# Patient Record
Sex: MALE | Race: WHITE | NOT HISPANIC OR LATINO | URBAN - METROPOLITAN AREA
[De-identification: names, ages, dates, MRNs, and addresses within clinical notes are randomized per-mention and may not be internally consistent; named-entity substitution may affect disease eponyms.]

---

## 2018-01-10 NOTE — PROGRESS NOTES
Assessment    1  Well adult exam (V70 0) (Z00 00)   2  Generalized anxiety disorder (300 02) (F41 1)   3  Family history of hyperlipidemia (V18 19) (Z83 49) : Father    Plan  Generalized anxiety disorder    · (Q) TSH, 3RD GENERATION; Status:Active; Requested for:02May2016;   Well adult exam    · (Q) CBC (INCLUDES DIFF/PLT) (REFL); Status:Active; Requested for:02May2016;    · (Q) COMPREHENSIVE METABOLIC PNL W/ADJUSTED CALCIUM; Status:Active; Requested for:02May2016;    · (Q) LIPID PANEL WITH NON-HDL CHOLESTEROL; Status:Active; Requested  for:02May2016;    · (Q) URINALYSIS MICROSCOPIC; Status:Active; Requested for:02May2016;     Discussion/Summary  Impression: health maintenance visit  Currently, he eats a healthy diet and has an inadequate exercise regimen  Testicular cancer screening: the risks and benefits of testicular cancer screening were discussed and monthly self testicular exam was advised  Colorectal cancer screening: the risks and benefits of colorectal cancer screening were discussed and colorectal cancer screening is not indicated  Screening lab work includes glucose, lipid profile and urinalysis  The risks and benefits of immunizations were discussed and recommended Tdap vaccination  Patient will check with insurance regarding coverage  Advice and education were given regarding aerobic exercise, calcium supplements, vitamin D supplements, sunscreen use, self skin examination and seat belt use  Patient discussion: discussed with the patient  1 Well adult PE - recommended to follow a well balanced diet, start regular exercise  Check labs  2  DU -stable  Continue Paxil 60 mg daily  Follow-up with psychiatrist Dr Rakesh Moon  Schedule followup visit in 1 year or call sooner with any acute problems  Chief Complaint  Patient here as new patient for PE      History of Present Illness  HM, Adult Male: The patient is being seen for a health maintenance evaluation   The last health maintenance visit was 4 year(s) ago  Social History: He is unmarried  Work status: not currently employed  The patient has never smoked cigarettes  He reports occasional alcohol use  He has never used illicit drugs  General Health: The patient's health since the last visit is described as good  He has regular dental visits  He denies vision problems  He denies hearing loss  Lifestyle:  He consumes a diverse and healthy diet  He does not have any weight concerns  He does not exercise regularly  Reproductive health:  the patient is sexually active  Screening: Testicular cancer screening includes monthly self testicular examinations  Colorectal cancer screening includes no previous screening  metabolic screening reviewed and updated  Cardiovascular risk factors: no hypertension, no diabetes, no obesity, no tobacco use and no illicit drug use  Safety elements used: seat belt and safe driving habits  HPI: New patient presents to establish medical care and physical exam      He has H/o DU, currently under the care of psychiatrist Dr Angella Sumner  Patient takes Paxil 60 mg daily  Denies feeling depressed  No suicidal ideations  Anxiety symptoms are controlled on current dose of Paxil  Family history is positive for CAD, Hyperlipidemia, DM  Review of Systems    Constitutional: no fever, no chills and not feeling tired  Weight has been stable  Eyes: no eye pain, no eyesight problems, no dryness of the eyes, eyes not red, no purulent discharge from the eyes and no itching of the eyes  ENT: no complaints of earache, no hearing loss, no nosebleeds, no nasal discharge, no sore throat, no hoarseness  Cardiovascular: no chest pain, no intermittent leg claudication, no palpitations and no extremity edema  Respiratory: no shortness of breath, no cough, no wheezing and no shortness of breath during exertion     Gastrointestinal: no abdominal pain, no nausea, no vomiting, no constipation, no diarrhea and no blood in stools  Genitourinary: no dysuria, no incontinence and no nocturia  Musculoskeletal: no arthralgias, no joint swelling, no myalgias and no joint stiffness  Integumentary: no rashes, no itching and no skin wound  Neurological: no headache, no numbness, no tingling, no dizziness and no fainting  Psychiatric: anxiety, but not suicidal, no sleep disturbances and no depression  Hematologic/Lymphatic: No complaints of swollen glands, no swollen glands in the neck, does not bleed easily, no easy bruising  Surgical History    · History of Tonsillectomy    Family History  Father    · Family history of CABG   · Family history of CAD (coronary artery disease)   · Family history of hyperlipidemia (V18 19) (Z83 49)  Paternal Grandfather    · Family history of diabetes mellitus (V18 0) (Z83 3)    Social History    · Never smoker   · No alcohol use   · No drug use    Current Meds   1  PARoxetine HCl - 30 MG Oral Tablet; take 2 tab  daily; Therapy: [id number] to Recorded    Allergies    1  No Known Drug Allergies    Vitals   Recorded: [id number] 01:01PM   Temperature 98 9 F   Heart Rate 74   Respiration 16   Systolic 828   Diastolic 78   Height 5 ft 8 5 in   Weight 156 lb 4 00 oz   BMI Calculated 23 41   BSA Calculated 1 85   Pain Scale 0     Physical Exam    Constitutional   General appearance: No acute distress, well appearing and well nourished  Head and Face   Head and face: Normal     Palpation of the face and sinuses: No sinus tenderness  Eyes   Conjunctiva and lids: No erythema, swelling or discharge  Pupils and irises: Equal, round, reactive to light  Ears, Nose, Mouth, and Throat   External inspection of ears and nose: Normal     Otoscopic examination: Tympanic membranes translucent with normal light reflex  Canals patent without erythema  Hearing: Normal     Nasal mucosa, septum, and turbinates: Normal without edema or erythema  Lips, teeth, and gums: Normal, good dentition  Oropharynx: Normal with no erythema, edema, exudate or lesions  Neck   Neck: Supple, symmetric, trachea midline, no masses  Pulmonary   Respiratory effort: No increased work of breathing or signs of respiratory distress  Auscultation of lungs: Clear to auscultation  Cardiovascular   Auscultation of heart: Normal rate and rhythm, normal S1 and S2, no murmurs  Carotid pulses: 2+ bilaterally  Abdominal aorta: Normal     Pedal pulses: 2+ bilaterally  Examination of extremities for edema and/or varicosities: Normal     Abdomen   Abdomen: Non-tender, no masses  Liver and spleen: No hepatomegaly or splenomegaly  Lymphatic   Palpation of lymph nodes in neck: No lymphadenopathy  Musculoskeletal   Gait and station: Normal     Inspection/palpation of digits and nails: Normal without clubbing or cyanosis  Inspection/palpation of joints, bones, and muscles: Normal     Skin   Skin and subcutaneous tissue: Normal without rashes or lesions  Neurologic   Cranial nerves: Cranial nerves 2-12 intact  Sensation: No sensory loss  Psychiatric   Judgment and insight: Normal     Recent and remote memory: Intact      Mood and affect: Normal        Results/Data  Urine Dip Automated- POC [id number] 01:36PM Chadwick Rodney     Test Name Result Flag Reference   Color Clear     Clarity Transparent     Leukocytes NEG     Nitrite NEG     Blood NEG     Bilirubin NEG     Urobilinogen 0 2     Protein NEG     Ph 6 5     Specific Gravity 1 010     Ketone NEG     Glucose +- 100mg/dL         Signatures   Electronically signed by : THUY Olivas ; May  2 2016  1:40PM EST                       (Author)

## 2020-11-17 RX ORDER — SERTRALINE HYDROCHLORIDE 100 MG/1
TABLET, FILM COATED ORAL
Qty: 135 TABLET | Refills: 1 | OUTPATIENT
Start: 2020-11-17

## 2021-04-13 DIAGNOSIS — Z23 ENCOUNTER FOR IMMUNIZATION: ICD-10-CM

## 2021-04-29 ENCOUNTER — IMMUNIZATIONS (OUTPATIENT)
Dept: FAMILY MEDICINE CLINIC | Facility: HOSPITAL | Age: 36
End: 2021-04-29

## 2021-04-29 DIAGNOSIS — Z23 ENCOUNTER FOR IMMUNIZATION: Primary | ICD-10-CM

## 2021-04-29 PROCEDURE — 0001A SARS-COV-2 / COVID-19 MRNA VACCINE (PFIZER-BIONTECH) 30 MCG: CPT

## 2021-04-29 PROCEDURE — 91300 SARS-COV-2 / COVID-19 MRNA VACCINE (PFIZER-BIONTECH) 30 MCG: CPT

## 2021-05-19 ENCOUNTER — IMMUNIZATIONS (OUTPATIENT)
Dept: FAMILY MEDICINE CLINIC | Facility: HOSPITAL | Age: 36
End: 2021-05-19

## 2021-05-19 DIAGNOSIS — Z23 ENCOUNTER FOR IMMUNIZATION: Primary | ICD-10-CM

## 2021-05-19 PROCEDURE — 91300 SARS-COV-2 / COVID-19 MRNA VACCINE (PFIZER-BIONTECH) 30 MCG: CPT

## 2021-05-19 PROCEDURE — 0002A SARS-COV-2 / COVID-19 MRNA VACCINE (PFIZER-BIONTECH) 30 MCG: CPT

## 2025-06-10 ENCOUNTER — OFFICE VISIT (OUTPATIENT)
Dept: FAMILY MEDICINE CLINIC | Facility: CLINIC | Age: 40
End: 2025-06-10
Payer: COMMERCIAL

## 2025-06-10 VITALS
DIASTOLIC BLOOD PRESSURE: 64 MMHG | RESPIRATION RATE: 16 BRPM | SYSTOLIC BLOOD PRESSURE: 110 MMHG | BODY MASS INDEX: 23.85 KG/M2 | TEMPERATURE: 97.9 F | WEIGHT: 161 LBS | HEIGHT: 69 IN | HEART RATE: 90 BPM | OXYGEN SATURATION: 98 %

## 2025-06-10 DIAGNOSIS — Z11.4 SCREENING FOR HIV (HUMAN IMMUNODEFICIENCY VIRUS): ICD-10-CM

## 2025-06-10 DIAGNOSIS — Z13.220 SCREENING FOR HYPERLIPIDEMIA: ICD-10-CM

## 2025-06-10 DIAGNOSIS — Z00.00 ANNUAL PHYSICAL EXAM: Primary | ICD-10-CM

## 2025-06-10 DIAGNOSIS — F41.9 ANXIETY: ICD-10-CM

## 2025-06-10 DIAGNOSIS — Z11.59 NEED FOR HEPATITIS C SCREENING TEST: ICD-10-CM

## 2025-06-10 PROCEDURE — 99396 PREV VISIT EST AGE 40-64: CPT | Performed by: FAMILY MEDICINE

## 2025-06-10 RX ORDER — SERTRALINE HYDROCHLORIDE 100 MG/1
1.5 TABLET, FILM COATED ORAL DAILY
COMMUNITY
Start: 2025-04-18

## 2025-06-10 NOTE — PATIENT INSTRUCTIONS
"Patient Education     Routine physical for adults   The Basics   Written by the doctors and editors at [facility]   What is a physical? -- A physical is a routine visit, or \"check-up,\" with your doctor. You might also hear it called a \"wellness visit\" or \"preventive visit.\"  During each visit, the doctor will:   Ask about your physical and mental health   Ask about your habits, behaviors, and lifestyle   Do an exam   Give you vaccines if needed   Talk to you about any medicines you take   Give advice about your health   Answer your questions  Getting regular check-ups is an important part of taking care of your health. It can help your doctor find and treat any problems you have. But it's also important for preventing health problems.  A routine physical is different from a \"sick visit.\" A sick visit is when you see a doctor because of a health concern or problem. Since physicals are scheduled ahead of time, you can think about what you want to ask the doctor.  How often should I get a physical? -- It depends on your age and health. In general, for people age 21 years and older:   If you are younger than 50 years, you might be able to get a physical every 3 years.   If you are 50 years or older, your doctor might recommend a physical every year.  If you have an ongoing health condition, like diabetes or high blood pressure, your doctor will probably want to see you more often.  What happens during a physical? -- In general, each visit will include:   Physical exam - The doctor or nurse will check your height, weight, heart rate, and blood pressure. They will also look at your eyes and ears. They will ask about how you are feeling and whether you have any symptoms that bother you.   Medicines - It's a good idea to bring a list of all the medicines you take to each doctor visit. Your doctor will talk to you about your medicines and answer any questions. Tell them if you are having any side effects that bother you. You " "should also tell them if you are having trouble paying for any of your medicines.   Habits and behaviors - This includes:   Your diet   Your exercise habits   Whether you smoke, drink alcohol, or use drugs   Whether you are sexually active   Whether you feel safe at home  Your doctor will talk to you about things you can do to improve your health and lower your risk of health problems. They will also offer help and support. For example, if you want to quit smoking, they can give you advice and might prescribe medicines. If you want to improve your diet or get more physical activity, they can help you with this, too.   Lab tests, if needed - The tests you get will depend on your age and situation. For example, your doctor might want to check your:   Cholesterol   Blood sugar   Iron level   Vaccines - The recommended vaccines will depend on your age, health, and what vaccines you already had. Vaccines are very important because they can prevent certain serious or deadly infections.   Discussion of screening - \"Screening\" means checking for diseases or other health problems before they cause symptoms. Your doctor can recommend screening based on your age, risk, and preferences. This might include tests to check for:   Cancer, such as breast, prostate, cervical, ovarian, colorectal, prostate, lung, or skin cancer   Sexually transmitted infections, such as chlamydia and gonorrhea   Mental health conditions like depression and anxiety  Your doctor will talk to you about the different types of screening tests. They can help you decide which screenings to have. They can also explain what the results might mean.   Answering questions - The physical is a good time to ask the doctor or nurse questions about your health. If needed, they can refer you to other doctors or specialists, too.  Adults older than 65 years often need other care, too. As you get older, your doctor will talk to you about:   How to prevent falling at " home   Hearing or vision tests   Memory testing   How to take your medicines safely   Making sure that you have the help and support you need at home  All topics are updated as new evidence becomes available and our peer review process is complete.  This topic retrieved from [employer] on: May 02, 2024.  Topic 353148 Version 1.0  Release: 32.4.3 - C32.122  © 2024 UpToDate, Inc. and/or its affiliates. All rights reserved.  Consumer Information Use and Disclaimer   Disclaimer: This generalized information is a limited summary of diagnosis, treatment, and/or medication information. It is not meant to be comprehensive and should be used as a tool to help the user understand and/or assess potential diagnostic and treatment options. It does NOT include all information about conditions, treatments, medications, side effects, or risks that may apply to a specific patient. It is not intended to be medical advice or a substitute for the medical advice, diagnosis, or treatment of a health care provider based on the health care provider's examination and assessment of a patient's specific and unique circumstances. Patients must speak with a health care provider for complete information about their health, medical questions, and treatment options, including any risks or benefits regarding use of medications. This information does not endorse any treatments or medications as safe, effective, or approved for treating a specific patient. UpToDate, Inc. and its affiliates disclaim any warranty or liability relating to this information or the use thereof.The use of this information is governed by the Terms of Use, available at https://www.woltersCamiantuwer.com/en/know/clinical-effectiveness-terms. 2024© UpToDate, Inc. and its affiliates and/or licensors. All rights reserved.  Copyright   © 2024 UpToDate, Inc. and/or its affiliates. All rights reserved.

## 2025-06-10 NOTE — ASSESSMENT & PLAN NOTE
[facility] psychiatry regularly. Continue zoloft 150mg QD per psychiatry.   Orders:    CBC and differential; Future    Comprehensive metabolic panel; Future    Lipid Panel with Direct LDL reflex; Future    TSH, 3rd generation with Free T4 reflex; Future

## 2025-06-10 NOTE — PROGRESS NOTES
Adult Annual Physical  Name: Rhys Billingsley      : 1985      MRN: [id number]  Encounter Provider: Samuel Akhtar MD  Encounter Date: 6/10/2025   Encounter department: [facility]    :  Assessment & Plan  Annual physical exam         Screening for HIV (human immunodeficiency virus)    Orders:    HIV 1/2 AG/AB w Reflex [facility] for 2 yr old and above; Future    Need for hepatitis C screening test    Orders:    Hepatitis C Antibody; Future    Anxiety  [facility] psychiatry regularly. Continue zoloft 150mg QD per psychiatry.   Orders:    CBC and differential; Future    Comprehensive metabolic panel; Future    Lipid Panel with Direct LDL reflex; Future    TSH, 3rd generation with Free T4 reflex; Future    Screening for hyperlipidemia    Orders:    CBC and differential; Future    Comprehensive metabolic panel; Future    Lipid Panel with Direct LDL reflex; Future    TSH, 3rd generation with Free T4 reflex; Future      Flu shot yearly.   Got covid19 shots and booster.   Will check insurance for coverage of Tdap.   RTO in 1 year.       Preventive Screenings:  - Diabetes Screening: risks/benefits discussed and orders placed  - Cholesterol Screening: risks/benefits discussed and orders placed   - Hepatitis C screening: risks/benefits discussed and orders placed   - HIV screening: risks/benefits discussed and orders placed   - Colon cancer screening: screening not indicated   - Lung cancer screening: screening not indicated   - Prostate cancer screening: screening not indicated     Immunizations:  - Immunizations due: Tdap  - Risks/benefits immunizations discussed      Counseling/Anticipatory Guidance:  - Alcohol: discussed moderation in alcohol intake and recommendations for healthy alcohol use.   - Drug use: discussed harms of illicit drug use and how it can negatively impact mental/physical health.   - Tobacco use: discussed harms of tobacco use and management options for quitting.   - Dental health:  discussed importance of regular tooth brushing, flossing, and dental visits.   - Sexual health: discussed sexually transmitted diseases, partner selection, use of condoms, avoidance of unintended pregnancy, and contraceptive alternatives.   - Diet: discussed recommendations for a healthy/well-balanced diet.   - Exercise: the importance of regular exercise/physical activity was discussed. Recommend exercise 3-5 times per week for at least 30 minutes.   - Injury prevention: discussed safety/seat belts, safety helmets, smoke detectors, carbon monoxide detectors, and smoking near bedding or upholstery.          History of Present Illness       Pt is here by himself to establish care.     Anxiety---He is on zoloft 150mg daily for 20 years which helped.   [facility] psychiatrist Dr. Cheikh Patel regularly.   Saw therapist before, not recently.     Denies depression.     Adult Annual Physical:  Patient presents for annual physical.     Diet and Physical Activity:  - Diet/Nutrition: well balanced diet.  - Exercise: walking.    Depression Screening:  - PHQ-2 Score: 0    General Health:  - Sleep: sleeps well and 7-8 hours of sleep on average.  - Hearing: normal hearing bilateral ears.  - Vision: no vision problems.  - Dental: regular dental visits.     Health:    - Urinary symptoms: none.     Review of Systems   Constitutional:  Negative for appetite change, chills and fever.   HENT:  Negative for congestion, ear pain, sinus pain and sore throat.    Eyes:  Negative for discharge and itching.   Respiratory:  Negative for apnea, cough, chest tightness, shortness of breath and wheezing.    Cardiovascular:  Negative for chest pain, palpitations and leg swelling.   Gastrointestinal:  Negative for abdominal pain, anal bleeding, constipation, diarrhea, nausea and vomiting.   Endocrine: Negative for cold intolerance, heat intolerance and polyuria.   Genitourinary:  Negative for difficulty urinating and dysuria.   Musculoskeletal:  Negative  "for arthralgias, back pain and myalgias.   Skin:  Negative for rash.   Neurological:  Negative for dizziness and headaches.   Psychiatric/Behavioral:  Negative for agitation.          Objective   /64 (BP Location: Left arm, Patient Position: Sitting, Cuff Size: Standard)   Pulse 90   Temp 97.9 °F (36.6 °C) (Temporal)   Resp 16   Ht 5' 8.5\" (1.74 m)   Wt 73 kg (161 lb)   SpO2 98%   BMI 24.12 kg/m²     Physical Exam  Constitutional:       Appearance: He is well-developed.   HENT:      Head: Normocephalic and atraumatic.     Eyes:      General:         Right eye: No discharge.         Left eye: No discharge.      Conjunctiva/sclera: Conjunctivae normal.       Cardiovascular:      Rate and Rhythm: Normal rate and regular rhythm.      Heart sounds: Normal heart sounds. No murmur heard.     No friction rub. No gallop.   Pulmonary:      Effort: Pulmonary effort is normal. No respiratory distress.      Breath sounds: Normal breath sounds. No wheezing or rales.   Abdominal:      General: Bowel sounds are normal. There is no distension.      Palpations: Abdomen is soft.      Tenderness: There is no abdominal tenderness. There is no guarding.     Musculoskeletal:         General: Normal range of motion.      Cervical back: Normal range of motion and neck supple.      Right lower leg: No edema.      Left lower leg: No edema.     Neurological:      Mental Status: He is alert.         "

## 2025-06-17 ENCOUNTER — APPOINTMENT (OUTPATIENT)
Dept: LAB | Facility: CLINIC | Age: 40
End: 2025-06-17
Payer: COMMERCIAL

## 2025-06-17 DIAGNOSIS — F41.9 ANXIETY: ICD-10-CM

## 2025-06-17 DIAGNOSIS — Z11.59 NEED FOR HEPATITIS C SCREENING TEST: ICD-10-CM

## 2025-06-17 DIAGNOSIS — Z13.220 SCREENING FOR HYPERLIPIDEMIA: ICD-10-CM

## 2025-06-17 DIAGNOSIS — Z11.4 SCREENING FOR HIV (HUMAN IMMUNODEFICIENCY VIRUS): ICD-10-CM

## 2025-06-17 LAB
ALBUMIN SERPL BCG-MCNC: 4.5 G/DL (ref 3.5–5)
ALP SERPL-CCNC: 62 U/L (ref 34–104)
ALT SERPL W P-5'-P-CCNC: 43 U/L (ref 7–52)
ANION GAP SERPL CALCULATED.3IONS-SCNC: 5 MMOL/L (ref 4–13)
AST SERPL W P-5'-P-CCNC: 29 U/L (ref 13–39)
BILIRUB SERPL-MCNC: 0.33 MG/DL (ref 0.2–1)
BUN SERPL-MCNC: 9 MG/DL (ref 5–25)
CALCIUM SERPL-MCNC: 9 MG/DL (ref 8.4–10.2)
CHLORIDE SERPL-SCNC: 101 MMOL/L (ref 96–108)
CHOLEST SERPL-MCNC: 213 MG/DL (ref ?–200)
CO2 SERPL-SCNC: 31 MMOL/L (ref 21–32)
CREAT SERPL-MCNC: 0.85 MG/DL (ref 0.6–1.3)
GFR SERPL CREATININE-BSD FRML MDRD: 108 ML/MIN/1.73SQ M
GLUCOSE P FAST SERPL-MCNC: 89 MG/DL (ref 65–99)
HDLC SERPL-MCNC: 41 MG/DL
LDLC SERPL CALC-MCNC: 127 MG/DL (ref 0–100)
POTASSIUM SERPL-SCNC: 3.7 MMOL/L (ref 3.5–5.3)
PROT SERPL-MCNC: 6.7 G/DL (ref 6.4–8.4)
SODIUM SERPL-SCNC: 137 MMOL/L (ref 135–147)
TRIGL SERPL-MCNC: 223 MG/DL (ref ?–150)
TSH SERPL DL<=0.05 MIU/L-ACNC: 2.69 UIU/ML (ref 0.45–4.5)

## 2025-06-17 PROCEDURE — 84443 ASSAY THYROID STIM HORMONE: CPT

## 2025-06-17 PROCEDURE — 80053 COMPREHEN METABOLIC PANEL: CPT

## 2025-06-17 PROCEDURE — 36415 COLL VENOUS BLD VENIPUNCTURE: CPT

## 2025-06-17 PROCEDURE — 80061 LIPID PANEL: CPT

## 2025-06-17 PROCEDURE — 86803 HEPATITIS C AB TEST: CPT

## 2025-06-17 PROCEDURE — 87389 HIV-1 AG W/HIV-1&-2 AB AG IA: CPT

## 2025-06-18 ENCOUNTER — APPOINTMENT (OUTPATIENT)
Dept: LAB | Facility: CLINIC | Age: 40
End: 2025-06-18
Payer: COMMERCIAL

## 2025-06-18 LAB
BASOPHILS # BLD AUTO: 0.07 THOUSANDS/ÂΜL (ref 0–0.1)
BASOPHILS NFR BLD AUTO: 1 % (ref 0–1)
EOSINOPHIL # BLD AUTO: 0.09 THOUSAND/ÂΜL (ref 0–0.61)
EOSINOPHIL NFR BLD AUTO: 2 % (ref 0–6)
ERYTHROCYTE [DISTWIDTH] IN BLOOD BY AUTOMATED COUNT: 12.1 % (ref 11.6–15.1)
HCT VFR BLD AUTO: 43.7 % (ref 36.5–49.3)
HCV AB SER QL: NORMAL
HGB BLD-MCNC: 14.2 G/DL (ref 12–17)
HIV 1+2 AB+HIV1 P24 AG SERPL QL IA: NORMAL
IMM GRANULOCYTES # BLD AUTO: 0.01 THOUSAND/UL (ref 0–0.2)
IMM GRANULOCYTES NFR BLD AUTO: 0 % (ref 0–2)
LYMPHOCYTES # BLD AUTO: 1.65 THOUSANDS/ÂΜL (ref 0.6–4.47)
LYMPHOCYTES NFR BLD AUTO: 33 % (ref 14–44)
MCH RBC QN AUTO: 29 PG (ref 26.8–34.3)
MCHC RBC AUTO-ENTMCNC: 32.5 G/DL (ref 31.4–37.4)
MCV RBC AUTO: 89 FL (ref 82–98)
MONOCYTES # BLD AUTO: 0.5 THOUSAND/ÂΜL (ref 0.17–1.22)
MONOCYTES NFR BLD AUTO: 10 % (ref 4–12)
NEUTROPHILS # BLD AUTO: 2.63 THOUSANDS/ÂΜL (ref 1.85–7.62)
NEUTS SEG NFR BLD AUTO: 54 % (ref 43–75)
NRBC BLD AUTO-RTO: 0 /100 WBCS
PLATELET # BLD AUTO: 231 THOUSANDS/UL (ref 149–390)
PMV BLD AUTO: 10.8 FL (ref 8.9–12.7)
RBC # BLD AUTO: 4.9 MILLION/UL (ref 3.88–5.62)
WBC # BLD AUTO: 4.95 THOUSAND/UL (ref 4.31–10.16)

## 2025-06-18 PROCEDURE — 36415 COLL VENOUS BLD VENIPUNCTURE: CPT

## 2025-06-18 PROCEDURE — 85025 COMPLETE CBC W/AUTO DIFF WBC: CPT

## 2025-06-19 ENCOUNTER — RESULTS FOLLOW-UP (OUTPATIENT)
Dept: FAMILY MEDICINE CLINIC | Facility: CLINIC | Age: 40
End: 2025-06-19